# Patient Record
Sex: MALE | Race: OTHER | HISPANIC OR LATINO | URBAN - METROPOLITAN AREA
[De-identification: names, ages, dates, MRNs, and addresses within clinical notes are randomized per-mention and may not be internally consistent; named-entity substitution may affect disease eponyms.]

---
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
## 2024-02-06 ENCOUNTER — EMERGENCY (EMERGENCY)
Facility: HOSPITAL | Age: 19
LOS: 1 days | Discharge: HOME | End: 2024-02-06
Attending: STUDENT IN AN ORGANIZED HEALTH CARE EDUCATION/TRAINING PROGRAM
Payer: MEDICAID

## 2024-02-06 VITALS
OXYGEN SATURATION: 98 % | HEART RATE: 67 BPM | SYSTOLIC BLOOD PRESSURE: 117 MMHG | WEIGHT: 154.32 LBS | RESPIRATION RATE: 18 BRPM | DIASTOLIC BLOOD PRESSURE: 82 MMHG | TEMPERATURE: 99 F

## 2024-02-06 PROCEDURE — 99284 EMERGENCY DEPT VISIT MOD MDM: CPT | Mod: 25

## 2024-02-06 PROCEDURE — 12001 RPR S/N/AX/GEN/TRNK 2.5CM/<: CPT

## 2024-02-06 NOTE — ED ADULT TRIAGE NOTE - CHIEF COMPLAINT QUOTE
as per  ID [id number] pt. stated "I got  caught while working on my left hand & metal got me x today.  noted laceration on left hand 2nd digit finger. no active bleeding in triage

## 2024-02-06 NOTE — ED ADULT NURSE NOTE - CHIEF COMPLAINT QUOTE
as per  ID [id number] pt. stated "I got  caught while working on my left hand & metal got me x today.  noted laceration on left hand 2nd digit finger. no active bleeding in triage

## 2024-02-07 VITALS
SYSTOLIC BLOOD PRESSURE: 109 MMHG | TEMPERATURE: 98 F | OXYGEN SATURATION: 100 % | RESPIRATION RATE: 18 BRPM | HEART RATE: 60 BPM | DIASTOLIC BLOOD PRESSURE: 66 MMHG

## 2024-02-07 PROCEDURE — 90715 TDAP VACCINE 7 YRS/> IM: CPT

## 2024-02-07 PROCEDURE — 73130 X-RAY EXAM OF HAND: CPT

## 2024-02-07 PROCEDURE — 73130 X-RAY EXAM OF HAND: CPT | Mod: 26,LT

## 2024-02-07 PROCEDURE — 12001 RPR S/N/AX/GEN/TRNK 2.5CM/<: CPT

## 2024-02-07 PROCEDURE — 99283 EMERGENCY DEPT VISIT LOW MDM: CPT | Mod: 25

## 2024-02-07 PROCEDURE — T1013: CPT

## 2024-02-07 PROCEDURE — 90471 IMMUNIZATION ADMIN: CPT

## 2024-02-07 RX ORDER — LIDOCAINE HCL 20 MG/ML
5 VIAL (ML) INJECTION ONCE
Refills: 0 | Status: COMPLETED | OUTPATIENT
Start: 2024-02-07 | End: 2024-02-07

## 2024-02-07 RX ORDER — LIDOCAINE HCL 20 MG/ML
5 VIAL (ML) INJECTION ONCE
Refills: 0 | Status: DISCONTINUED | OUTPATIENT
Start: 2024-02-07 | End: 2024-02-07

## 2024-02-07 RX ORDER — TETANUS TOXOID, REDUCED DIPHTHERIA TOXOID AND ACELLULAR PERTUSSIS VACCINE, ADSORBED 5; 2.5; 8; 8; 2.5 [IU]/.5ML; [IU]/.5ML; UG/.5ML; UG/.5ML; UG/.5ML
0.5 SUSPENSION INTRAMUSCULAR ONCE
Refills: 0 | Status: COMPLETED | OUTPATIENT
Start: 2024-02-07 | End: 2024-02-07

## 2024-02-07 RX ADMIN — TETANUS TOXOID, REDUCED DIPHTHERIA TOXOID AND ACELLULAR PERTUSSIS VACCINE, ADSORBED 0.5 MILLILITER(S): 5; 2.5; 8; 8; 2.5 SUSPENSION INTRAMUSCULAR at 01:53

## 2024-02-07 RX ADMIN — Medication 5 MILLILITER(S): at 03:36

## 2024-02-07 NOTE — ED PROVIDER NOTE - OBJECTIVE STATEMENT
18-year-old male presenting with left index injury while at work.  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  Laceration to the distal aspect of the left distal  phalanx.  Denying any other injuries from work.   Endorses pain to the area, otherwise denying any numbness or tingling.  No nausea, vomiting, fever or chills. 18-year-old male presenting with left index injury while at work.  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  Laceration to the distal aspect of the left distal  phalanx.  Denying any other injuries from work.   Endorses pain to the area, otherwise denying any numbness or tingling.  No nausea, vomiting, fever or chills.     ID [id number]

## 2024-02-07 NOTE — ED PROCEDURE NOTE - PROCEDURE ADDITIONAL DETAILS
The wound was prepped and draped in sterile fashion. Anesthesia was achieved with  mL of (1% lidocaine  The wound was irrigated with 1000cc NS and explored. There were no foreign bodies (tendon injuries etc). The wound was reapproximated using   6 -0 prolene sutures.   There was excellent reapproximation of the wound edges.  The patient tolerated the procedure without complication.

## 2024-02-07 NOTE — ED PROVIDER NOTE - NSFOLLOWUPCLINICS_GEN_ALL_ED_FT
Pediatric Plastic Surgery  Pediatric Plastic Surgery  [address] [id number]  Phone: [phone]  Fax: [phone]

## 2024-02-07 NOTE — ED PROVIDER NOTE - CLINICAL SUMMARY MEDICAL DECISION MAKING FREE TEXT BOX
18-year-old male presenting with left index injury while at work.  superficial laceration L distal phalanx palmar aspect. No FBs appreciated, superficial. No tendon involvement. Full ROM of the finger and cap refill <2 s. No fractures or FBs seen on XR.  Laceration performed, no foreign bodies appreciated, very superficial laceration.  3 stitches placed with 6-0 nylon.  Patient instructed to follow-up in 5 days to have stitches removed.  Patient also instructed on strict return precautions.   Patient struck to follow-up with hand specialist in 1 week.

## 2024-02-07 NOTE — ED PROVIDER NOTE - PHYSICAL EXAMINATION
Gen: NAD; well appearing  Head: NCAT  Eyes: EOMI, PERRLA, no conjunctival pallor, no scleral icterus  ENT: mucous membranes moist, no discharge  Neck: neck supple  Resp: CTAB, no W/R/R  CV: RRR, +S1/S2, no M/R/G  GI: Abdomen soft non-distended, NTTP, no masses  MSK: No open wounds, no bruising, no lower extremity edema  Neuro: A&Ox4, sensation nl, motor 5/5 RUE/LUE/RLE/LLE, follows commands  Ext: no edema, no deformity, warm and well-perfused  Skin: superficial laceration L distal phalanx palmar aspect. No FBs appreciated, superficial. No tendon involvement. Full ROM of the finger and cap refill <2 s Gen: NAD; well appearing  Resp: CTAB, no W/R/R  CV: RRR, +S1/S2, no M/R/G  GI: Abdomen soft non-distended, NTTP, no masses  Neuro: A&Ox4, sensation nl, MAEx4, follows commands  Ext:  superficial laceration L distal phalanx palmar aspect. No FBs appreciated, superficial. No tendon involvement. Full ROM of the finger and cap refill <2 s, sensation intact.

## 2024-02-07 NOTE — ED PROCEDURE NOTE - NS_EDPROVIDERDISPOUSERTYPE_ED_A_ED
Attending Attestation (For Attendings USE Only)... Hpi Title: Evaluation of Skin Lesions Additional History: Here for FBSE. Has a couple spots on upper back she’d like checked.\\nRed spots on nose\\nSpf30, reapplies, hats, sunglasses \\nGrew up in [place]

## 2024-02-07 NOTE — ED PROVIDER NOTE - NSPTACCESSSVCSAPPTDETAILS_ED_ALL_ED_FT
Please assist patient in scheduling appointment with HAND in 1 week for follow up of finger laceration.

## 2024-02-07 NOTE — ED PROVIDER NOTE - PATIENT PORTAL LINK FT
You can access the FollowMyHealth Patient Portal offered by [facility] by registering at the following website: [email or web address]. By joining [employer]’s FollowMyHealth portal, you will also be able to view your health information using other applications (apps) compatible with our system.

## 2024-02-07 NOTE — ED PROCEDURE NOTE - FINGER LOCATION
- Get blood work done today  - Schedule x-ray of left knee (can schedule online through [employer] or [employer] or call [phone])  - Try and eat three full meals a day as far as the dizziness/lightheadedness symptoms  - If something jumps out on the x- index

## 2024-02-07 NOTE — ED PROVIDER NOTE - NSFOLLOWUPINSTRUCTIONS_ED_ALL_ED_FT
– Regrese si hay algún empeoramiento o síntomas preocupantes (tucker más abajo).  - Seguimiento con médico especialista en gris en los próximos 2 a 3 días.  – Seguimiento con médico de atención primaria en los próximos 5 a 7 días.    Stitches, Staples, or Adhesive Wound Closure  Los médicos usan puntos (suturas), grapas y ciertos pegamentos (adhesivos para la piel) para mantener la piel unida mientras patrick (cierre de la herida). Es posible que necesite alfredo tratamiento después de maggie cirugía o si se corta la piel accidentalmente. Estos métodos ayudan a que dalton piel sane más rápidamente. Debe regresar en __6_ días para que le retiren las suturas. Lynn las primeras 24 horas mantenga el área seca, luego puede latonia normalmente con agua y jabón ligero, no frote el área.    Comuníquese con dalton médico si:  Tienes fiebre.  Tienes escalofríos.  Tiene enrojecimiento, hinchazón (hinchazón) o dolor en el lugar de la herida.  Le sale líquido, selvin o pus de la herida.  Sale un mal olor de tu herida.  Los bordes de la piel de la herida comienzan a separarse después de que se hayan retirado las suturas.  Dalton herida se vuelve gruesa, elevada y de color más oscuro después de que salen las suturas (cicatrización).  Esta información no pretende reemplazar los consejos que le brinde dalton proveedor de atención médica. Asegúrese de discutir cualquier pregunta que tenga con dalton proveedor de atención médica.

## 2024-02-11 ENCOUNTER — EMERGENCY (EMERGENCY)
Facility: HOSPITAL | Age: 19
LOS: 1 days | Discharge: HOME | End: 2024-02-11
Attending: STUDENT IN AN ORGANIZED HEALTH CARE EDUCATION/TRAINING PROGRAM
Payer: MEDICAID

## 2024-02-11 VITALS
SYSTOLIC BLOOD PRESSURE: 106 MMHG | HEART RATE: 67 BPM | OXYGEN SATURATION: 96 % | TEMPERATURE: 98 F | RESPIRATION RATE: 20 BRPM | DIASTOLIC BLOOD PRESSURE: 67 MMHG | WEIGHT: 156.53 LBS

## 2024-02-11 PROCEDURE — T1013: CPT

## 2024-02-11 PROCEDURE — L9995: CPT

## 2024-02-11 PROCEDURE — G0463: CPT

## 2024-02-11 NOTE — ED PROVIDER NOTE - OBJECTIVE STATEMENT
18-year-old male Upper sorbian speaking ID [id number] used, no pertinent past medical history presenting to emergency department for evaluation of sutures.  Patient states he was told to return for removal.  No fever, redness, purulent drainage.  Denies difficulty moving finger, numbness, other complaint.

## 2024-02-11 NOTE — ED PROVIDER NOTE - ATTENDING APP SHARED VISIT CONTRIBUTION OF CARE
I, Eduardo Barrett DO reviewed the [facility] plan of care and discussed the case with the ACP.  I was available for any questions and concerns

## 2024-02-11 NOTE — ED PROVIDER NOTE - NSFOLLOWUPINSTRUCTIONS_ED_ALL_ED_FT
Return on Tuesday for suture removal,   Return to ED for redness, swelling or fever.   You may take Tylenol and or Motrin for pain    Regrese el rachel para retirar la sutura.  Regrese al servicio de urgencias si presenta enrojecimiento, hinchazón o fiebre.  Puede karthik Tylenol o Motrin para el dolor.    Cuidado de las heridas suturadas  Sutured Wound Care  Las suturas son puntos que pueden usarse para cerrar heridas. Las suturas vienen en diferentes materiales. Pueden desintegrarse a medida que la herida cicatriza (suturas absorbibles) o puede ser necesario quitarlas (suturas no reabsorbibles). El cuidado correcto de las heridas puede ayudar a evitar el dolor y a prevenir las infecciones. Además, puede ayudar a que la cicatrización sea más rápida. Siga las instrucciones del médico acerca del cuidado de la herida suturada.    Materiales necesarios:  Agua y jabón.  Maggie toalla limpia y seca.  Limpiador de heridas o solución salina, si es necesario.  Maggie gasa o venda limpia (vendaje), si es necesario.  Pomada con antibiótico, si se lo indicó el médico.  Cómo cuidar de la herida suturada  Two stitched wounds. One is normal. The other is red with pus and infected.  Mantenga la herida completamente seca sarai las primeras 24 horas o sarai el tiempo que le haya indicado el médico. Después de 24 a 48 horas, puede ducharse o bañarse amarilis se lo haya indicado el médico. No moje ni sumerja la herida en agua hasta que le hayan quitado las suturas.  Después de las primeras 24 horas, limpie la herida maggie vez al día, o con la frecuencia que le haya indicado el médico. Siga estos pasos:  Lave y enjuague la herida amarilis se lo haya indicado el médico.  Séquela dando palmaditas con maggie toalla limpia. No frote la herida.  Después de limpiar la herida, aplique maggie delgada capa de ungüento con antibiótico amarilis se lo haya indicado el médico. Herriman evitará infecciones y hará que el vendaje no se adhiera a la herida.  Siga las instrucciones del médico acerca de cómo cambiar el vendaje. Asegúrese de hacer lo siguiente:  Lávese las gris con agua y jabón sarai al menos 20 segundos antes y después de cambiar el vendaje. Use desinfectante para gris si no dispone de agua y jabón.  Cambie el vendaje al menos maggie vez al día o con la frecuencia que le haya indicado el médico. Si el vendaje se moja o se ensucia, cámbielo.  No quite las suturas ni otros cierres cutáneos, amarilis tiras adhesivas o goma para cerrar la piel. Es posible que estos cierres cutáneos deban quedar puestos en la piel sarai 2 semanas o más tiempo. Si los bordes de las tiras adhesivas empiezan a despegarse y enroscarse, puede recortar los que estén sueltos. No retire las tiras adhesivas por completo a menos que el médico se lo indique.  Controle la herida todos los días para detectar signos de infección. Esté atento a lo siguiente:  Enrojecimiento, hinchazón o dolor.  Líquido o selvin.  Calor, erupción cutánea o dureza en el lugar de la herida de reciente aparición.  Pus o mal olor.  Jocelyne que le retiren las suturas amarilis se lo haya indicado el médico.  Siga estas instrucciones en mo casa:  Medicamentos    Sharpes o aplíquese los medicamentos de venta cyndy y los recetados solamente amarilis se lo haya indicado el médico.  Si le recetaron un medicamento o ungüento con antibiótico, tómelo o aplíqueselo amarilis se lo haya indicado el médico. No deje de usar el antibiótico aunque la afección mejore.  Instrucciones generales    Para ayudar a reducir la formación de cicatrices después de que mo herida sane, cubra la herida con ropa o aplíquese pantalla solar con factor de protección solar (FPS) 30, amarilis mínimo, siempre que esté al aire cyndy.  No se rasque ni se toque la herida.  No estire la herida.  Cuando esté sentado o acostado, levante (eleve) la maria m de la lesión por encima del nivel del corazón, si es posible.  Siga maggie dieta que incluya proteínas, vitaminas A y vitamina C que ayudarán a que la herida cicatrice.  Brielle suficiente líquido amarilis para mantener la orina de color amarillo pálido.  Concurra a todas las visitas de seguimiento. Herriman es importante.  Comuníquese con un médico si:  Le aplicaron la vacuna antitetánica y tiene hinchazón, dolor intenso, enrojecimiento o hemorragia en el sitio de la inyección.  La herida se le abre o nota un cuerpo extraño que sale de alexandrea, amarilis un trozo de vargas o roxanne.  Tiene cualquiera de estos signos de infección:  Enrojecimiento, hinchazón o dolor alrededor de la herida.  Líquido o selvin que salen de la herida.  Calor, erupción cutánea o dureza alrededor de la herida de reciente aparición.  Fiebre.  La piel alrededor de la herida cambia de color.  Mo dolor no se ophelia con medicamentos.  Presenta adormecimiento alrededor de la herida.  Solicite ayuda de inmediato si:  Tiene mucha hinchazón o más dolor alrededor de la herida.  Observa pus o percibe mal olor que salen de la herida.  Palpa nódulos dolorosos cerca de la herida o en cualquier maria m del cuerpo.  Tiene maggie línea shyla que se extiende desde la herida.  La herida está en la mano o el pie y:  Los dedos se te pálidos o azulados.  No puede  de forma adecuada un dedo de la mano o del pie.  Tiene adormecimiento que llega hasta la mano, el pie o los dedos.  Resumen  Las suturas son puntos que pueden usarse para cerrar heridas.  El cuidado correcto de las heridas puede ayudar a evitar el dolor y a prevenir las infecciones.  Mantenga la herida completamente seca sarai las primeras 24 horas o sarai el tiempo que le haya indicado el médico. Después de 24 a 48 horas, puede ducharse o bañarse amarilis se lo haya indicado el médico.  Para ayudar con la cicatrización, coma alimentos con alto contenido de proteínas, vitamina A y vitamina C.  Esta información no tiene amarilis fin reemplazar el consejo del médico. Asegúrese de hacerle al médico cualquier pregunta que tenga.

## 2024-02-11 NOTE — ED PROVIDER NOTE - PHYSICAL EXAMINATION
Gen: NAD, AOx3, able to make needs known, non-toxic  Lung: CTAB, no respiratory distress, no wheezes/rhonchi/rales B/L, speaking in full sentences  CV: RRR, no murmurs  Abd: non distended, soft, nontender, no guarding, no CVA tenderness  MSK: no visible deformities  Neuro: Appears non focal  Skin: 3 sutures to distal aspect of left 2nd phalanx, partially healed  Psych: normal affect

## 2024-02-11 NOTE — ED PROVIDER NOTE - CLINICAL SUMMARY MEDICAL DECISION MAKING FREE TEXT BOX
18-year-old male no pertinent past medical history presenting to emergency department for evaluation of sutures.  Patient states he was told to return for removal.  No fever, redness, purulent drainage.  Denies difficulty moving finger, numbness, other complaint. PE as above,  sutures not ready to be removed at this time, discussed with patient through , wound precautions given patient verbalized understanding and will return on Tuesday for suture removal.

## 2024-02-11 NOTE — ED PROVIDER NOTE - PATIENT PORTAL LINK FT
You can access the FollowMyHealth Patient Portal offered by [facility] by registering at the following website: [email or web address]. By joining [facility]’s FollowMyHealth portal, you will also be able to view your health information using other applications (apps) compatible with our system.

## 2025-07-11 NOTE — H&P ADULT - NSHPREVIEWOFSYSTEMS_GEN_ALL_CORE
Name band; REVIEW OF SYSTEMS:    CONSTITUTIONAL: No weakness, fevers or chills  EYES/ENT: No visual changes;  No vertigo or throat pain   NECK: No pain or stiffness  RESPIRATORY: No cough, wheezing, hemoptysis; No shortness of breath  CARDIOVASCULAR: No chest pain or palpitations  GASTROINTESTINAL: No abdominal or epigastric pain. No nausea, vomiting, or hematemesis; No diarrhea or constipation. No melena or hematochezia.  GENITOURINARY: No dysuria, frequency or hematuria  NEUROLOGICAL: No numbness or weakness  SKIN: No itching, rashes    MSK: knee pain and swelling on left

## 2025-07-19 NOTE — ED PROVIDER NOTE - CROS ED ROS STATEMENT
HPI   51 yo presents in follow up for graves disease(6/24 methimazole started) and dm2.  A1c-5.9% today.     Dm2:  Pt with dm2 (3/ 2024:A1c-8%).  -had gi upset on metformin 500mg ir   -ozempic 2 mg weekly and transitioned to mounjaro 1/2025, up to 12.5mg dose and tolerating  -pt nervous to test sugars, uses olivia intermittently  -pt exercising on regular basis/logging food that she eats    Lipid:  -pt not interested in statin for primary prevention, pt is aware of risk/benefits     Thyroid:  TSH-<0.01, ft4-3.5 march 2024,   -in 2023 tsh 0.01-0.02, ft4-2-3.1 range  -June 2024 tsh<0.01, ft4-2.9/ft3-8.5, tpo+, tsi-, tsh rec ab+ (10mg methimazole added), dose lowered to 5mg sept 2024  -July 2025 tsh-wnl (tsi/trab neg)     Sx:  -no obstructive sx  -no eye sx  -pt feeling euthyroid, no more tachycardia          Current Outpatient Medications:     ascorbic acid (Vitamin C) 500 mg tablet, Take 1 tablet (500 mg) by mouth once daily., Disp: , Rfl:     b complex 0.4 mg tablet, Take by mouth., Disp: , Rfl:     cholecalciferol (Vitamin D3) 50 mcg (2,000 unit) capsule, Take 1 capsule (50 mcg) by mouth early in the morning.., Disp: , Rfl:     FreeStyle Olivia 3 Sensor device, Apply every 2 weeks, Disp: 2 each, Rfl: 11    L. acidophilus/Bifid. animalis (DAILY PROBIOTIC ORAL), Take by mouth., Disp: , Rfl:     methIMAzole (Tapazole) 5 mg tablet, TAKE 1 TABLET(5 MG) BY MOUTH DAILY, Disp: 90 tablet, Rfl: 1    multivit-min/iron/folic acid/K (ADULTS MULTIVITAMIN ORAL), Take 1 tablet by mouth once daily., Disp: , Rfl:     tirzepatide (Mounjaro) 10 mg/0.5 mL pen injector, Inject 10 mg under the skin 1 (one) time per week., Disp: 2 mL, Rfl: 5    tirzepatide (Mounjaro) 12.5 mg/0.5 mL pen injector, Inject 12.5 mg under the skin 1 (one) time per week., Disp: 2 mL, Rfl: 5    triamcinolone (Kenalog) 0.1 % ointment, Apply 1 Application topically 2 times a day., Disp: 80 g, Rfl: 1    zinc gluconate 100 mg tablet, Take 1 tablet (100 mg) by mouth  "once daily., Disp: , Rfl:       Allergies as of 07/22/2025    (No Known Allergies)         Review of Systems   Cardiology: Lightheadedness-denies.  Chest pain-denies.  Leg edema-denies.  Palpitations-denies.  Respiratory: Cough-denies. Shortness of breath-denies.  Wheezing-denies.  Gastroenterology: Constipation-denies.  Diarrhea-denies.  Heartburn-denies.  Endocrinology: Cold intolerance-denies.  Heat intolerance-denies.  Sweats-denies.  Neurology: Headache-denies.  Tremor-denies.  Neuropathy in extremities-denies.  Psychology: Low energy-denies.  Irritability-denies.  Sleep disturbances-denies.      /70 (BP Location: Right arm, Patient Position: Sitting, BP Cuff Size: Adult)   Pulse 74   Wt 55.2 kg (121 lb 12.8 oz)   BMI 23.79 kg/m²       Labs:  Lab Results   Component Value Date    WBC 7.6 01/17/2025    NRBC 0.0 01/17/2025    RBC 4.95 01/17/2025    HGB 14.2 01/17/2025    HCT 43.3 01/17/2025     01/17/2025     Lab Results   Component Value Date    CALCIUM 9.1 01/17/2025    AST 22 01/17/2025    ALKPHOS 60 01/17/2025    BILITOT 0.4 01/17/2025    PROT 6.9 01/17/2025    ALBUMIN 4.4 01/17/2025    GLOB 2.5 03/08/2023    AGR 1.7 03/08/2023     01/17/2025    K 4.5 01/17/2025     01/17/2025    CO2 28 01/17/2025    ANIONGAP 11 01/17/2025    BUN 19 01/17/2025    CREATININE 0.76 01/17/2025    UREACREAUR 32.5 (H) 06/16/2023    GLUCOSE 100 (H) 01/17/2025    ALT 23 01/17/2025    EGFR >90 01/17/2025     Lab Results   Component Value Date    CHOL 201 (H) 07/15/2025    TRIG 53 07/15/2025    HDL 75 07/15/2025    LDLCALC 112 (H) 07/15/2025     Lab Results   Component Value Date    MICROALBCREA  01/17/2025      Comment:      One or more analytes used in this calculation is outside of the analytical measurement range. Calculation cannot be performed.     Lab Results   Component Value Date    TSH 2.81 07/15/2025     No results found for: \"[id number]\"  Lab Results   Component Value Date    HGBA1C 5.9 " 07/22/2025         Physical Exam   General Appearance: pleasant, cooperative, no acute distress  HEENT: no chemosis, no proptosis, no lid lag, no lid retraction  Neck: no lymphadenopathy, no thyromegaly, no dominant thyroid nodules  Heart: no murmurs, regular rate and rhythm, S1 and S2  Lungs: no wheezes, no rhonci, no rales  Extremities: no lower extremity swelling      Assessment/Plan   1. Type 2 diabetes mellitus without complication, without long-term current use of insulin (Primary)  -A1c ordered and reviewed  -labs reviewed  -refills sent    -overall doing well, can adjust mounjaro in the future if needed, no change today    2. Hyperthyroidism  -stop methimazole  -repeat labs prior to 6 month visit or if having sx    3. Dyslipidemia  -pt not interested in statin for primary prevention         Follow Up:    Ana 6 months    -labs/tests/notes reviewed  -reviewed and counseled patient on medication monitoring and side effects           all other ROS negative except as per HPI